# Patient Record
(demographics unavailable — no encounter records)

---

## 2024-11-18 NOTE — DISCUSSION/SUMMARY
[EKG obtained to assist in diagnosis and management of assessed problem(s)] : EKG obtained to assist in diagnosis and management of assessed problem(s) [FreeTextEntry1] : Impressions:  1.  AF: EKG performed today to assess for presence of conduction disease and reveals afib w/ RVR. Discussed treatment options for afib including rate control vs antiarrhythmics vs possible ablation. Given persistent symptomatic afib despite rate control management recommend undergoing DCCV to restore NSR. If NSR restored and improvement noted, consider afib ablation in future. Risks, benefits, and alternatives discussed.   Will consider and will call to schedule if chooses to proceed.

## 2024-11-18 NOTE — HISTORY OF PRESENT ILLNESS
[FreeTextEntry1] : Wilbert Germain is a 73 y/o man PMH dementia and persistent afib diagnosed 2 years ago. Here for initial visit to evaluate persistent afib with RVR. On Xarelto and bisoprolol 5mg. Patient reports palpitations, SOB and occasional vertigo. He reports symptoms occurring 2-3 times a day. Denies chest pain syncope or near syncope. Has been on Xarelto without s/s of bleeding.

## 2025-03-19 NOTE — HISTORY OF PRESENT ILLNESS
[FreeTextEntry1] : Wilbert Germain is a 72 y/o man PMH dementia and persistent afib diagnosed 2 years ago who presents today for routine f/u post ablation. He is s/p 2/12/2025 afib ablation using PFA with successful PVI with bidirectional block and posterior wall isolation. Right groin without pain, swelling, or bruising. Denies chest pain syncope or near syncope. Has been on Xarelto without s/s of bleeding.

## 2025-03-19 NOTE — HISTORY OF PRESENT ILLNESS
[FreeTextEntry1] : Wilbert Germain is a 74 y/o man PMH dementia and persistent afib diagnosed 2 years ago who presents today for routine f/u post ablation. He is s/p 2/12/2025 afib ablation using PFA with successful PVI with bidirectional block and posterior wall isolation. Right groin without pain, swelling, or bruising. Denies chest pain syncope or near syncope. Has been on Xarelto without s/s of bleeding.

## 2025-03-19 NOTE — DISCUSSION/SUMMARY
[EKG obtained to assist in diagnosis and management of assessed problem(s)] : EKG obtained to assist in diagnosis and management of assessed problem(s) [FreeTextEntry1] : Impressions:  1.  AF:  He is s/p 2/12/2025 afib ablation using PFA with successful PVI with bidirectional block and posterior wall isolation. EKG performed today to assess for presence of conduction disease and reveals sinus bradycardia. Resume Eliquis and bisoprolol as prescribed.   Resume routine f/u with Cardiologist and RTO for f/u in 3 months.